# Patient Record
Sex: FEMALE | Race: WHITE | NOT HISPANIC OR LATINO | RURAL
[De-identification: names, ages, dates, MRNs, and addresses within clinical notes are randomized per-mention and may not be internally consistent; named-entity substitution may affect disease eponyms.]

---

## 2022-04-26 ENCOUNTER — APPOINTMENT (RX ONLY)
Dept: RURAL CLINIC 5 | Facility: CLINIC | Age: 52
Setting detail: DERMATOLOGY
End: 2022-04-26

## 2022-04-26 DIAGNOSIS — L91.8 OTHER HYPERTROPHIC DISORDERS OF THE SKIN: ICD-10-CM

## 2022-04-26 DIAGNOSIS — D22 MELANOCYTIC NEVI: ICD-10-CM

## 2022-04-26 DIAGNOSIS — Z92.3 PERSONAL HISTORY OF IRRADIATION: ICD-10-CM

## 2022-04-26 DIAGNOSIS — L57.8 OTHER SKIN CHANGES DUE TO CHRONIC EXPOSURE TO NONIONIZING RADIATION: ICD-10-CM

## 2022-04-26 PROBLEM — D22.0 MELANOCYTIC NEVI OF LIP: Status: ACTIVE | Noted: 2022-04-26

## 2022-04-26 PROBLEM — D48.5 NEOPLASM OF UNCERTAIN BEHAVIOR OF SKIN: Status: ACTIVE | Noted: 2022-04-26

## 2022-04-26 PROBLEM — D22.39 MELANOCYTIC NEVI OF OTHER PARTS OF FACE: Status: ACTIVE | Noted: 2022-04-26

## 2022-04-26 PROBLEM — D23.39 OTHER BENIGN NEOPLASM OF SKIN OF OTHER PARTS OF FACE: Status: ACTIVE | Noted: 2022-04-26

## 2022-04-26 PROBLEM — D22.4 MELANOCYTIC NEVI OF SCALP AND NECK: Status: ACTIVE | Noted: 2022-04-26

## 2022-04-26 PROCEDURE — ? TREATMENT REGIMEN

## 2022-04-26 PROCEDURE — ? OBSERVATION

## 2022-04-26 PROCEDURE — 99203 OFFICE O/P NEW LOW 30 MIN: CPT

## 2022-04-26 PROCEDURE — ? DEFER

## 2022-04-26 PROCEDURE — ? COUNSELING

## 2022-04-26 ASSESSMENT — LOCATION DETAILED DESCRIPTION DERM
LOCATION DETAILED: LEFT LOWER CUTANEOUS LIP
LOCATION DETAILED: RIGHT MEDIAL INFERIOR EYELID
LOCATION DETAILED: RIGHT SUPERIOR LATERAL NECK
LOCATION DETAILED: RIGHT INFRAMAMMARY CREASE (INNER QUADRANT)
LOCATION DETAILED: LEFT MEDIAL INFERIOR EYELID
LOCATION DETAILED: LEFT LATERAL INFERIOR EYELID
LOCATION DETAILED: RIGHT LATERAL INFERIOR EYELID
LOCATION DETAILED: LEFT SUPERIOR CENTRAL BUCCAL CHEEK
LOCATION DETAILED: LEFT INFERIOR CENTRAL MALAR CHEEK
LOCATION DETAILED: RIGHT LATERAL SUPERIOR CHEST

## 2022-04-26 ASSESSMENT — LOCATION ZONE DERM
LOCATION ZONE: EYELID
LOCATION ZONE: NECK
LOCATION ZONE: FACE
LOCATION ZONE: LIP
LOCATION ZONE: TRUNK

## 2022-04-26 ASSESSMENT — LOCATION SIMPLE DESCRIPTION DERM
LOCATION SIMPLE: RIGHT BREAST
LOCATION SIMPLE: LEFT CHEEK
LOCATION SIMPLE: RIGHT INFERIOR EYELID
LOCATION SIMPLE: RIGHT ANTERIOR NECK
LOCATION SIMPLE: LEFT INFERIOR EYELID
LOCATION SIMPLE: LEFT LIP
LOCATION SIMPLE: CHEST

## 2022-04-26 NOTE — PROCEDURE: DEFER
Detail Level: Simple
Procedure To Be Performed At Next Visit: Skin Tag removal (Cosmetic)
Introduction Text (Please End With A Colon): The following procedure was deferred:

## 2022-04-26 NOTE — PROCEDURE: TREATMENT REGIMEN
Plan: Will recheck this site when it is less inflamed in a couple months.  Try not to mess with it.
Detail Level: Simple
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  She will monitor for enlargement and recheck in 3 months.
Plan: Reviewed how radiation affects the skin and that it is important to continue regular moisturizer to keep the skin strengthened, as well as regular skin check due to the increased risk of skin cancer in this area.  She will monitor and follow up regularly.

## 2022-04-26 NOTE — HPI: SKIN LESION
What Type Of Note Output Would You Prefer (Optional)?: Standard Output
How Severe Is Your Skin Lesion?: moderate
Has Your Skin Lesion Been Treated?: not been treated
Is This A New Presentation, Or A Follow-Up?: Skin Lesion Referral
Who Is Your Referring Provider?: María Britton
Additional History: Developed after radiation exposure for breast cancer.  She also has some skin changes under her right breast.  She has been applying moisturizer regularly.

## 2022-07-27 ENCOUNTER — APPOINTMENT (RX ONLY)
Dept: RURAL CLINIC 5 | Facility: CLINIC | Age: 52
Setting detail: DERMATOLOGY
End: 2022-07-27

## 2022-07-27 DIAGNOSIS — L81.0 POSTINFLAMMATORY HYPERPIGMENTATION: ICD-10-CM | Status: RESOLVING

## 2022-07-27 PROCEDURE — 99212 OFFICE O/P EST SF 10 MIN: CPT

## 2022-07-27 PROCEDURE — ? TREATMENT REGIMEN

## 2022-07-27 ASSESSMENT — LOCATION SIMPLE DESCRIPTION DERM: LOCATION SIMPLE: RIGHT BREAST

## 2022-07-27 ASSESSMENT — LOCATION DETAILED DESCRIPTION DERM: LOCATION DETAILED: RIGHT LATERAL BREAST 11-12:00 REGION

## 2022-07-27 ASSESSMENT — LOCATION ZONE DERM: LOCATION ZONE: TRUNK

## 2022-07-27 NOTE — PROCEDURE: TREATMENT REGIMEN
Plan: Monitor for return. Make sure to sun protect and consider yearly skin exams.
Detail Level: Simple

## 2023-07-27 ENCOUNTER — APPOINTMENT (RX ONLY)
Dept: RURAL CLINIC 5 | Facility: CLINIC | Age: 53
Setting detail: DERMATOLOGY
End: 2023-07-27

## 2023-07-27 DIAGNOSIS — L73.8 OTHER SPECIFIED FOLLICULAR DISORDERS: ICD-10-CM

## 2023-07-27 DIAGNOSIS — L91.8 OTHER HYPERTROPHIC DISORDERS OF THE SKIN: ICD-10-CM

## 2023-07-27 DIAGNOSIS — D18.0 HEMANGIOMA: ICD-10-CM

## 2023-07-27 DIAGNOSIS — D22 MELANOCYTIC NEVI: ICD-10-CM

## 2023-07-27 DIAGNOSIS — L82.1 OTHER SEBORRHEIC KERATOSIS: ICD-10-CM

## 2023-07-27 DIAGNOSIS — Z92.3 PERSONAL HISTORY OF IRRADIATION: ICD-10-CM

## 2023-07-27 PROBLEM — D22.39 MELANOCYTIC NEVI OF OTHER PARTS OF FACE: Status: ACTIVE | Noted: 2023-07-27

## 2023-07-27 PROBLEM — D22.5 MELANOCYTIC NEVI OF TRUNK: Status: ACTIVE | Noted: 2023-07-27

## 2023-07-27 PROBLEM — D23.39 OTHER BENIGN NEOPLASM OF SKIN OF OTHER PARTS OF FACE: Status: ACTIVE | Noted: 2023-07-27

## 2023-07-27 PROBLEM — D22.0 MELANOCYTIC NEVI OF LIP: Status: ACTIVE | Noted: 2023-07-27

## 2023-07-27 PROBLEM — D22.71 MELANOCYTIC NEVI OF RIGHT LOWER LIMB, INCLUDING HIP: Status: ACTIVE | Noted: 2023-07-27

## 2023-07-27 PROBLEM — D22.4 MELANOCYTIC NEVI OF SCALP AND NECK: Status: ACTIVE | Noted: 2023-07-27

## 2023-07-27 PROBLEM — D22.72 MELANOCYTIC NEVI OF LEFT LOWER LIMB, INCLUDING HIP: Status: ACTIVE | Noted: 2023-07-27

## 2023-07-27 PROBLEM — D22.61 MELANOCYTIC NEVI OF RIGHT UPPER LIMB, INCLUDING SHOULDER: Status: ACTIVE | Noted: 2023-07-27

## 2023-07-27 PROBLEM — D18.01 HEMANGIOMA OF SKIN AND SUBCUTANEOUS TISSUE: Status: ACTIVE | Noted: 2023-07-27

## 2023-07-27 PROCEDURE — 99213 OFFICE O/P EST LOW 20 MIN: CPT

## 2023-07-27 PROCEDURE — ? TREATMENT REGIMEN

## 2023-07-27 PROCEDURE — ? COUNSELING

## 2023-07-27 ASSESSMENT — LOCATION DETAILED DESCRIPTION DERM
LOCATION DETAILED: LEFT CENTRAL BUCCAL CHEEK
LOCATION DETAILED: RIGHT INFRAMAMMARY CREASE (INNER QUADRANT)
LOCATION DETAILED: RIGHT ANTERIOR DISTAL THIGH
LOCATION DETAILED: RIGHT MID-UPPER BACK
LOCATION DETAILED: RIGHT RADIAL DORSAL HAND
LOCATION DETAILED: LEFT CENTRAL LATERAL NECK
LOCATION DETAILED: RIGHT SUPERIOR MEDIAL MIDBACK
LOCATION DETAILED: LEFT CENTRAL MALAR CHEEK
LOCATION DETAILED: LEFT PROXIMAL POSTERIOR THIGH
LOCATION DETAILED: RIGHT MEDIAL INFERIOR EYELID
LOCATION DETAILED: RIGHT LATERAL INFERIOR EYELID
LOCATION DETAILED: RIGHT INFERIOR UPPER BACK
LOCATION DETAILED: LEFT MID-UPPER BACK
LOCATION DETAILED: RIGHT SUPERIOR CENTRAL MALAR CHEEK
LOCATION DETAILED: RIGHT SUPERIOR ANTERIOR NECK
LOCATION DETAILED: RIGHT RIB CAGE
LOCATION DETAILED: RIGHT LATERAL SUPERIOR CHEST
LOCATION DETAILED: RIGHT PROXIMAL DORSAL FOREARM
LOCATION DETAILED: LEFT LOWER CUTANEOUS LIP
LOCATION DETAILED: LEFT SUPERIOR UPPER BACK
LOCATION DETAILED: LEFT MEDIAL INFERIOR EYELID
LOCATION DETAILED: LEFT LATERAL INFERIOR EYELID

## 2023-07-27 ASSESSMENT — LOCATION SIMPLE DESCRIPTION DERM
LOCATION SIMPLE: LEFT INFERIOR EYELID
LOCATION SIMPLE: RIGHT FOREARM
LOCATION SIMPLE: LEFT POSTERIOR THIGH
LOCATION SIMPLE: NECK
LOCATION SIMPLE: RIGHT ANTERIOR NECK
LOCATION SIMPLE: RIGHT UPPER BACK
LOCATION SIMPLE: RIGHT BREAST
LOCATION SIMPLE: CHEST
LOCATION SIMPLE: RIGHT HAND
LOCATION SIMPLE: RIGHT LOWER BACK
LOCATION SIMPLE: LEFT LIP
LOCATION SIMPLE: RIGHT CHEEK
LOCATION SIMPLE: LEFT UPPER BACK
LOCATION SIMPLE: RIGHT INFERIOR EYELID
LOCATION SIMPLE: ABDOMEN
LOCATION SIMPLE: RIGHT THIGH
LOCATION SIMPLE: LEFT CHEEK

## 2023-07-27 ASSESSMENT — LOCATION ZONE DERM
LOCATION ZONE: ARM
LOCATION ZONE: HAND
LOCATION ZONE: LIP
LOCATION ZONE: NECK
LOCATION ZONE: EYELID
LOCATION ZONE: TRUNK
LOCATION ZONE: FACE
LOCATION ZONE: LEG

## 2023-07-27 NOTE — PROCEDURE: TREATMENT REGIMEN
Detail Level: Simple
Plan: Reviewed how radiation affects the skin and that it is important to continue regular moisturizer to keep the skin strengthened, as well as regular skin check due to the increased risk of skin cancer in this area.  She will monitor and follow up regularly.